# Patient Record
Sex: MALE | Race: WHITE | NOT HISPANIC OR LATINO | Employment: FULL TIME | URBAN - METROPOLITAN AREA
[De-identification: names, ages, dates, MRNs, and addresses within clinical notes are randomized per-mention and may not be internally consistent; named-entity substitution may affect disease eponyms.]

---

## 2018-11-13 ENCOUNTER — OFFICE VISIT (OUTPATIENT)
Dept: SURGERY | Facility: CLINIC | Age: 33
End: 2018-11-13

## 2018-11-13 VITALS
SYSTOLIC BLOOD PRESSURE: 140 MMHG | HEIGHT: 69 IN | WEIGHT: 240 LBS | BODY MASS INDEX: 35.55 KG/M2 | DIASTOLIC BLOOD PRESSURE: 76 MMHG

## 2018-11-13 DIAGNOSIS — L72.3 SEBACEOUS CYST: Primary | ICD-10-CM

## 2018-11-13 PROCEDURE — 99201 PR OFFICE OUTPATIENT NEW 10 MINUTES: CPT | Performed by: SURGERY

## 2018-11-13 NOTE — PROGRESS NOTES
"    PATIENT INFORMATION  Jarett Patel       - 1985    CHIEF COMPLAINT  Chief Complaint   Patient presents with   • Cyst     left cheek       HISTORY OF PRESENT ILLNESS  HPI is a long-standing cyst on his left cheek.  Is not been any drainage is not been any infection at the site.        REVIEW OF SYSTEMS  Review of Systems      ACTIVE PROBLEMS  There are no active problems to display for this patient.        PAST MEDICAL HISTORY  No past medical history on file.      SURGICAL HISTORY  No past surgical history on file.      FAMILY HISTORY  No family history on file.      SOCIAL HISTORY  Social History     Occupational History   • Not on file   Tobacco Use   • Smoking status: Not on file   Substance and Sexual Activity   • Alcohol use: Not on file   • Drug use: Not on file   • Sexual activity: Not on file       Debilities/Disabilities Identified: None    Emotional Behavior: Appropriate    CURRENT MEDICATIONS  No current outpatient medications on file.    ALLERGIES  Patient has no known allergies.    VITALS  Vitals:    18 0901   BP: 140/76   Weight: 109 kg (240 lb)   Height: 175.3 cm (69\")       LAST RESULTS   No results found for any previous visit.     No results found.    PHYSICAL EXAM  Physical Exam white male in no active distress he has a 1.1 cm noninfected sebaceous cysts on his left mid cheek.    ASSESSMENT  Sebaceous cyst      PLAN  The risks benefits and options were discussed with the patient in detail we will excise this in the office next week                            "

## 2018-11-20 ENCOUNTER — PROCEDURE VISIT (OUTPATIENT)
Dept: SURGERY | Facility: CLINIC | Age: 33
End: 2018-11-20

## 2018-11-20 VITALS
DIASTOLIC BLOOD PRESSURE: 72 MMHG | HEIGHT: 69 IN | SYSTOLIC BLOOD PRESSURE: 128 MMHG | WEIGHT: 240 LBS | BODY MASS INDEX: 35.55 KG/M2

## 2018-11-20 DIAGNOSIS — L72.3 SEBACEOUS CYST: Primary | ICD-10-CM

## 2018-11-20 PROCEDURE — 11442 EXC FACE-MM B9+MARG 1.1-2 CM: CPT | Performed by: SURGERY

## 2018-11-20 NOTE — PROGRESS NOTES
exc cyst L cheek, no complaints  The site was prepped draped locally infiltrated 1% lidocaine with epinephrine.  Was elliptically excised the cyst was 2 cm.  Specimen was sent.  Skin was closed in interrupted simple fashion with use of 4-0 nylon.  Wound was cleaned and dried sterilely dressed pressure was applied.  He tolerated well.  I will see him back in 78 days.  Wound care was discussed.

## 2018-11-23 LAB
DX ICD CODE: NORMAL
DX ICD CODE: NORMAL
PATH REPORT.FINAL DX SPEC: NORMAL
PATH REPORT.GROSS SPEC: NORMAL
PATH REPORT.SITE OF ORIGIN SPEC: NORMAL
PATHOLOGIST NAME: NORMAL
PAYMENT PROCEDURE: NORMAL

## 2018-11-27 ENCOUNTER — OFFICE VISIT (OUTPATIENT)
Dept: SURGERY | Facility: CLINIC | Age: 33
End: 2018-11-27

## 2018-11-27 DIAGNOSIS — Z09 SURGICAL FOLLOW-UP CARE: Primary | ICD-10-CM

## 2018-11-27 PROCEDURE — 99024 POSTOP FOLLOW-UP VISIT: CPT | Performed by: SURGERY

## 2018-11-27 NOTE — PROGRESS NOTES
1 week s/p excision left cheek cyst.   He is without complaints.  His incision is healing well.  His path was consistent with a ruptured and inflamed epidermal inclusion cyst.  His sutures were removed wound care was discussed I will see him back when necessary

## 2021-12-16 ENCOUNTER — TRANSCRIBE ORDERS (OUTPATIENT)
Dept: ADMINISTRATIVE | Facility: HOSPITAL | Age: 36
End: 2021-12-16

## 2021-12-16 DIAGNOSIS — U07.1 CLINICAL DIAGNOSIS OF SEVERE ACUTE RESPIRATORY SYNDROME CORONAVIRUS 2 (SARS-COV-2) DISEASE: Primary | ICD-10-CM
